# Patient Record
Sex: FEMALE | Race: WHITE | NOT HISPANIC OR LATINO | Employment: FULL TIME | URBAN - METROPOLITAN AREA
[De-identification: names, ages, dates, MRNs, and addresses within clinical notes are randomized per-mention and may not be internally consistent; named-entity substitution may affect disease eponyms.]

---

## 2017-01-21 ENCOUNTER — OFFICE VISIT (OUTPATIENT)
Dept: URGENT CARE | Facility: PHYSICIAN GROUP | Age: 22
End: 2017-01-21
Payer: COMMERCIAL

## 2017-01-21 VITALS
HEART RATE: 101 BPM | TEMPERATURE: 98.5 F | HEIGHT: 68 IN | BODY MASS INDEX: 20.46 KG/M2 | SYSTOLIC BLOOD PRESSURE: 102 MMHG | OXYGEN SATURATION: 98 % | RESPIRATION RATE: 18 BRPM | WEIGHT: 135 LBS | DIASTOLIC BLOOD PRESSURE: 68 MMHG

## 2017-01-21 DIAGNOSIS — S29.011A CHEST WALL MUSCLE STRAIN, INITIAL ENCOUNTER: ICD-10-CM

## 2017-01-21 DIAGNOSIS — J06.9 UPPER RESPIRATORY INFECTION WITH COUGH AND CONGESTION: ICD-10-CM

## 2017-01-21 PROCEDURE — 99214 OFFICE O/P EST MOD 30 MIN: CPT | Performed by: NURSE PRACTITIONER

## 2017-01-21 RX ORDER — CODEINE PHOSPHATE AND GUAIFENESIN 10; 100 MG/5ML; MG/5ML
5 SOLUTION ORAL
Qty: 120 ML | Refills: 0 | Status: SHIPPED | OUTPATIENT
Start: 2017-01-21

## 2017-01-21 RX ORDER — BENZONATATE 100 MG/1
100 CAPSULE ORAL 3 TIMES DAILY PRN
COMMUNITY
End: 2017-12-06

## 2017-01-21 RX ORDER — BENZONATATE 100 MG/1
100 CAPSULE ORAL 3 TIMES DAILY PRN
Qty: 60 CAP | Refills: 0 | Status: SHIPPED | OUTPATIENT
Start: 2017-01-21 | End: 2017-12-06

## 2017-01-21 ASSESSMENT — PATIENT HEALTH QUESTIONNAIRE - PHQ9: CLINICAL INTERPRETATION OF PHQ2 SCORE: 0

## 2017-01-21 NOTE — MR AVS SNAPSHOT
"        Lucio Barba   2017 11:15 AM   Office Visit   MRN: [id number]    Department:  [facility]   Dept Phone:  [phone]    Description:  Female : 1995   Provider:  ANA Summers           Reason for Visit     Cough x4 days (restarted), causing pain L rib area      Allergies as of 2017     No Known Allergies      You were diagnosed with     Upper respiratory infection with cough and congestion   [[id number]]         Vital Signs     Blood Pressure Pulse Temperature Respirations Height Weight    102/68 mmHg 101 36.9 °C (98.5 °F) 18 1.727 m (5' 7.99\") 61.236 kg (135 lb)    Body Mass Index Oxygen Saturation Last Menstrual Period Breastfeeding? Smoking Status       20.53 kg/m2 98% 2017 No Never Smoker        Basic Information     Date Of Birth Sex Race Ethnicity Preferred Language    1995 Female White Non- English      Health Maintenance        Date Due Completion Dates    IMM HEP B VACCINE (1 of 3 - Primary Series) 1994 ---    IMM HEP A VACCINE (1 of 2 - Standard Series) 1995 ---    IMM HPV VACCINE (1 of 3 - Female 3 Dose Series) 2005 ---    IMM VARICELLA (CHICKENPOX) VACCINE (1 of 2 - 2 Dose Adolescent Series) 2007 ---    IMM DTaP/Tdap/Td Vaccine (1 - Tdap) 2013 ---    PAP SMEAR 2015 ---    IMM INFLUENZA (1) 2016 ---            Current Immunizations     No immunizations on file.      Below and/or attached are the medications your provider expects you to take. Review all of your home medications and newly ordered medications with your provider and/or pharmacist. Follow medication instructions as directed by your provider and/or pharmacist. Please keep your medication list with you and share with your provider. Update the information when medications are discontinued, doses are changed, or new medications (including over-the-counter products) are added; and carry medication information at all times in the event of emergency " situations     Allergies:  No Known Allergies          Medications  Valid as of: January 21, 2017 - 11:56 AM    Generic Name Brand Name Tablet Size Instructions for use    Benzonatate (Cap) TESSALON 100 MG Take 100 mg by mouth 3 times a day as needed for Cough.        Benzonatate (Cap) TESSALON 100 MG Take 1 Cap by mouth 3 times a day as needed for Cough.        Guaifenesin-Codeine (Solution) ROBITUSSIN -10 mg/5mL Take 5 mL by mouth at bedtime as needed.        Levonorgestrel-Ethinyl Estrad   Take  by mouth.        .                 Medicines prescribed today were sent to:     [facility][id number] - [place], [place] - [address]    [address] [place] [address]    Phone: [phone] Fax: [phone]    Open 24 Hours?: No    [id number] - [place], [place] - [address] [place] [address]    [address] [place] [address] [place] [address]    Phone: [phone] Fax: [phone]    Open 24 Hours?: No    [id number] - [place], [place] - [address]    [address] [place] [address]    Phone: [phone] Fax: [phone]    Open 24 Hours?: No      Medication refill instructions:       If your prescription bottle indicates you have medication refills left, it is not necessary to call your provider’s office. Please contact your pharmacy and they will refill your medication.    If your prescription bottle indicates you do not have any refills left, you may request refills at any time through one of the following ways: The online [facility] system (except Urgent Care), by calling your provider’s office, or by asking your pharmacy to contact your provider’s office with a refill request. Medication refills are processed only during regular business hours and may not be available until the next business day. Your provider may request additional information or to have a follow-up visit with you prior to refilling your medication.   *Please Note: Medication refills are assigned a new Rx number when refilled electronically.  Your pharmacy may indicate that no refills were authorized even though a new prescription for the same medication is available at the pharmacy. Please request the medicine by name with the pharmacy before contacting your provider for a refill.           MyChart Status: Patient Declined

## 2017-01-21 NOTE — PROGRESS NOTES
Chief Complaint   Patient presents with   • Cough     x4 days (restarted), causing pain L rib area       HISTORY OF PRESENT ILLNESS: Patient is a 21 y.o. female who presents today due to symptoms that started four days ago. Pt reports a dry cough without blood in sputum, chills, and nasal congestion in the morning. Denies associated chest pain, shortness of breath, wheezing, sore throat, sinus pressure, fever, or body aches. She also reports left sided chest wall pain with coughing, denies injury or trauma. Denies h/o asthma/copd/CAP. No immunocompromise. Has tried OTC cold medications without significant relief of symptoms. No recent ABX use. No other aggravating or alleviating factors.     There are no active problems to display for this patient.      Allergies:Review of patient's allergies indicates no known allergies.    Current Outpatient Prescriptions Ordered in [facility]   Medication Sig Dispense Refill   • benzonatate (TESSALON) 100 MG Cap Take 100 mg by mouth 3 times a day as needed for Cough.     • Levonorgestrel-Ethinyl Estrad (AVIANE PO) Take  by mouth.       No current Epic-ordered facility-administered medications on file.       No past medical history on file.    Social History   Substance Use Topics   • Smoking status: Never Smoker    • Smokeless tobacco: Not on file   • Alcohol Use: No       No family status information on file.   No family history on file.    ROS:  Review of Systems   Constitutional: Positive for chills. Negative for fever, weight loss and malaise.  HENT: Positive for congestion. Negative for sore throat, ear pain, nosebleeds, and neck pain.    Eyes: Negative for vision changes.   Cardiovascular: Negative for chest pain, palpitations, orthopnea and leg swelling.   Respiratory: Positive for cough without sputum production, left sided chest wall pain with coughing. Negative for shortness of breath and wheezing.   Gastrointestinal: Negative for abdominal pain, nausea, vomiting or diarrhea.  "  Skin: Negative for rash, diaphoresis.     Exam:  Blood pressure 102/68, pulse 101, temperature 36.9 °C (98.5 °F), resp. rate 18, height 1.727 m (5' 7.99\"), weight 61.236 kg (135 lb), last menstrual period 01/13/2017, SpO2 98 %, not currently breastfeeding.  General: well-nourished, well-developed female in NAD  Head: normocephalic, atraumatic  Eyes: PERRLA, EOM within normal limits, no conjunctival injection, no scleral icterus, visual fields and acuity grossly intact.  Ears: normal shape and symmetry, no tenderness, no discharge. External canals are without any significant edema or erythema. Tympanic membranes are without any inflammation, no effusion. Gross auditory acuity is intact.  Nose: symmetrical without tenderness, mild discharge, erythema present bilateral nares.  Mouth/Throat: reasonable hygiene, no exudates or tonsillar enlargement. Erythema present.   Neck: no masses, range of motion within normal limits, no tracheal deviation.  Lymph: mild cervical adenopathy. No supraclavicular adenopathy.   Neuro: alert and oriented. Cranial nerves 1-12 grossly intact.   Cardiovascular: regular rate and rhythm without murmurs, rubs, or gallops. No edema.   Pulmonary: no distress. Chest is symmetrical with respiration, no wheezes, crackles, or rhonchi. There is mild left sided mid axillary chest wall tenderness with palpation, no crepitus or ecchymosis, no bony tenderness.   Musculoskeletal: appropriate muscle tone, gait is stable.  Skin: warm, dry, intact, no clubbing, no cyanosis.   Psych: appropriate mood, affect, judgement.         Assessment/Plan:  1. Upper respiratory infection with cough and congestion  guaifenesin-codeine (ROBITUSSIN AC) Solution oral solution    benzonatate (TESSALON) 100 MG Cap   2. Chest wall muscle strain, initial encounter             Discussed that I felt this was viral in nature. Did not see any evidence of a bacterial process. Discussed natural progression and sx care.  Rest, " increase fluids, hand and respiratory hygiene. May take OTC medications as directed for symptom relief. Honey for cough.   Tessalon for daytime cough. Robitussin AC for night time cough, sedative effects of medication discussed with patient.   Warm compresses and OTC NSAIDS for chest muscle strain.   Supportive care, differential diagnoses, and indications for immediate follow-up discussed with patient.   Pathogenesis of diagnosis discussed including typical length and natural progression.  Instructed to return to clinic or nearest emergency department for any change in condition, further concerns, or worsening of symptoms.  Patient states understanding of the plan of care and discharge instructions.  Instructed to make an appointment with their primary care provider in the next 3-7 days if not significantly improving and for further care.         Please note that this dictation was created using voice recognition software. I have made every reasonable attempt to correct obvious errors, but I expect that there are errors of grammar and possibly content that I did not discover before finalizing the note.      SAQIB Scanlon.

## 2017-12-06 ENCOUNTER — OFFICE VISIT (OUTPATIENT)
Dept: URGENT CARE | Facility: PHYSICIAN GROUP | Age: 22
End: 2017-12-06
Payer: COMMERCIAL

## 2017-12-06 VITALS
RESPIRATION RATE: 14 BRPM | TEMPERATURE: 98.5 F | WEIGHT: 141 LBS | SYSTOLIC BLOOD PRESSURE: 106 MMHG | OXYGEN SATURATION: 99 % | DIASTOLIC BLOOD PRESSURE: 64 MMHG | HEIGHT: 67 IN | BODY MASS INDEX: 22.13 KG/M2 | HEART RATE: 68 BPM

## 2017-12-06 DIAGNOSIS — J06.9 VIRAL URI: ICD-10-CM

## 2017-12-06 PROCEDURE — 99214 OFFICE O/P EST MOD 30 MIN: CPT | Performed by: FAMILY MEDICINE

## 2017-12-06 RX ORDER — BENZONATATE 100 MG/1
100 CAPSULE ORAL 3 TIMES DAILY PRN
Qty: 60 CAP | Refills: 0 | Status: SHIPPED | OUTPATIENT
Start: 2017-12-06

## 2017-12-06 ASSESSMENT — ENCOUNTER SYMPTOMS
RHINORRHEA: 1
ABDOMINAL PAIN: 0
HEADACHES: 0
COUGH: 1

## 2017-12-06 NOTE — PROGRESS NOTES
"Subjective:   Lucio Barba is a 22 y.o. female who presents for URI (x5days chest congestion, runny nose , scratchy throat, cough)        URI    This is a new problem. The current episode started in the past 7 days. The problem has been rapidly worsening. Maximum temperature: unmeasured. Associated symptoms include congestion, coughing and rhinorrhea. Pertinent negatives include no abdominal pain or headaches.     Review of Systems   HENT: Positive for congestion and rhinorrhea.    Respiratory: Positive for cough.    Gastrointestinal: Negative for abdominal pain.   Neurological: Negative for headaches.     No Known Allergies   Objective:   /64   Pulse 68   Temp 36.9 °C (98.5 °F)   Resp 14   Ht 1.702 m (5' 7\")   Wt 64 kg (141 lb)   SpO2 99%   BMI 22.08 kg/m²   Physical Exam   Constitutional: She is oriented to person, place, and time. She appears well-developed and well-nourished. No distress.   HENT:   Head: Normocephalic and atraumatic.   Nose: Mucosal edema and rhinorrhea present. No sinus tenderness. Right sinus exhibits no maxillary sinus tenderness and no frontal sinus tenderness. Left sinus exhibits no maxillary sinus tenderness and no frontal sinus tenderness.   Eyes: Conjunctivae and EOM are normal. Pupils are equal, round, and reactive to light.   Cardiovascular: Normal rate and regular rhythm.    No murmur heard.  Pulmonary/Chest: Effort normal and breath sounds normal. No respiratory distress.   Abdominal: Soft. She exhibits no distension. There is no tenderness.   Musculoskeletal: Normal range of motion.   Neurological: She is alert and oriented to person, place, and time. She has normal reflexes. No sensory deficit.   Skin: Skin is warm and dry.   Psychiatric: She has a normal mood and affect.         Assessment/Plan:   Assessment    1. Viral URI  Differential diagnosis, natural history, supportive care, and indications for immediate follow-up discussed.   - benzonatate (TESSALON) 100 MG " Cap; Take 1 Cap by mouth 3 times a day as needed for Cough.  Dispense: 60 Cap; Refill: 0

## 2017-12-06 NOTE — LETTER
December 6, 2017         Patient: Lucio Barba   YOB: 1995   Date of Visit: 12/6/2017           To Whom it May Concern:    Lucio Barba was seen in my clinic on 12/6/2017. She may return to work on 12/9/2017 unless improved prior..    If you have any questions or concerns, please don't hesitate to call.        Sincerely,           Faisal Ball M.D.  Electronically Signed

## 2019-03-13 ENCOUNTER — OFFICE VISIT (OUTPATIENT)
Dept: URGENT CARE | Facility: PHYSICIAN GROUP | Age: 24
End: 2019-03-13
Payer: COMMERCIAL

## 2019-03-13 VITALS
TEMPERATURE: 97.4 F | HEART RATE: 76 BPM | RESPIRATION RATE: 18 BRPM | WEIGHT: 141 LBS | OXYGEN SATURATION: 97 % | BODY MASS INDEX: 22.13 KG/M2 | HEIGHT: 67 IN | DIASTOLIC BLOOD PRESSURE: 78 MMHG | SYSTOLIC BLOOD PRESSURE: 100 MMHG

## 2019-03-13 DIAGNOSIS — R05.9 COUGH: ICD-10-CM

## 2019-03-13 DIAGNOSIS — M54.50 ACUTE BILATERAL LOW BACK PAIN WITHOUT SCIATICA: ICD-10-CM

## 2019-03-13 DIAGNOSIS — J02.9 SORE THROAT: ICD-10-CM

## 2019-03-13 DIAGNOSIS — S39.012A LOW BACK STRAIN, INITIAL ENCOUNTER: ICD-10-CM

## 2019-03-13 LAB
APPEARANCE UR: NORMAL
BILIRUB UR STRIP-MCNC: NORMAL MG/DL
COLOR UR AUTO: NORMAL
GLUCOSE UR STRIP.AUTO-MCNC: NEGATIVE MG/DL
INT CON NEG: NORMAL
INT CON NEG: NORMAL
INT CON POS: NORMAL
INT CON POS: NORMAL
KETONES UR STRIP.AUTO-MCNC: 40 MG/DL
LEUKOCYTE ESTERASE UR QL STRIP.AUTO: NEGATIVE
NITRITE UR QL STRIP.AUTO: NEGATIVE
PH UR STRIP.AUTO: 5.5 [PH] (ref 5–8)
POC URINE PREGNANCY TEST: NEGATIVE
PROT UR QL STRIP: 30 MG/DL
RBC UR QL AUTO: NORMAL
S PYO AG THROAT QL: NEGATIVE
SP GR UR STRIP.AUTO: 1.03
UROBILINOGEN UR STRIP-MCNC: 1 MG/DL

## 2019-03-13 PROCEDURE — 87880 STREP A ASSAY W/OPTIC: CPT | Performed by: NURSE PRACTITIONER

## 2019-03-13 PROCEDURE — 81002 URINALYSIS NONAUTO W/O SCOPE: CPT | Performed by: NURSE PRACTITIONER

## 2019-03-13 PROCEDURE — 99214 OFFICE O/P EST MOD 30 MIN: CPT | Performed by: NURSE PRACTITIONER

## 2019-03-13 PROCEDURE — 81025 URINE PREGNANCY TEST: CPT | Performed by: NURSE PRACTITIONER

## 2019-03-13 RX ORDER — BENZONATATE 100 MG/1
100 CAPSULE ORAL 3 TIMES DAILY PRN
Qty: 60 CAP | Refills: 0 | Status: SHIPPED | OUTPATIENT
Start: 2019-03-13

## 2019-03-13 ASSESSMENT — ENCOUNTER SYMPTOMS
HEADACHES: 0
SHORTNESS OF BREATH: 0
WEAKNESS: 0
EYE DISCHARGE: 0
FEVER: 0
CHILLS: 0
SORE THROAT: 1
ORTHOPNEA: 0
FLANK PAIN: 0
FOCAL WEAKNESS: 0
CONSTIPATION: 0
DIZZINESS: 0
WHEEZING: 0
BACK PAIN: 1
MYALGIAS: 1
SPUTUM PRODUCTION: 0
EYE REDNESS: 0
SENSORY CHANGE: 0
NECK PAIN: 0
ABDOMINAL PAIN: 0
DIARRHEA: 0
COUGH: 1
FALLS: 0
VOMITING: 0
PALPITATIONS: 0
SINUS PAIN: 0
TINGLING: 0
NAUSEA: 0

## 2019-03-13 NOTE — PROGRESS NOTES
Subjective:      Lucio Barba is a 23 y.o. female who presents with Pharyngitis (x 1 day, lower back pain x 2 days)            HPI  Sore throat, nasal congestion, PND. Denies fever. Mild cough. Requesting tessalon perles, cough worse at work with exertion. Denies asthma, SOB, chest tightness or wheeze.    C/o bilat low back pain. Denies dysuria, urinary frequency or urgency. Denies n/v/d or abdominal pain. H/o scoliosis. 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 Does not recall any injury at work.       PMH:  has no past medical history on file.  MEDS:   Current Outpatient Prescriptions:   •  benzonatate (TESSALON) 100 MG Cap, Take 1 Cap by mouth 3 times a day as needed for Cough., Disp: 60 Cap, Rfl: 0  •  guaifenesin-codeine (ROBITUSSIN AC) Solution oral solution, Take 5 mL by mouth at bedtime as needed., Disp: 120 mL, Rfl: 0  •  Levonorgestrel-Ethinyl Estrad (AVIANE PO), Take  by mouth., Disp: , Rfl:   ALLERGIES:   Allergies   Allergen Reactions   • Codeine      Family history of codeine allergy, patient does not want codeine     SURGHX: History reviewed. No pertinent surgical history.  SOCHX:  reports that she has never smoked. She has never used smokeless tobacco. She reports that she does not drink alcohol or use drugs.  FH: Family history was reviewed, no pertinent findings to report    Review of Systems   Constitutional: Negative for chills, fever and malaise/fatigue.   HENT: Positive for congestion and sore throat. Negative for ear pain and sinus pain.    Eyes: Negative for discharge and redness.   Respiratory: Positive for cough. Negative for sputum production, shortness of breath and wheezing.    Cardiovascular: Negative for chest pain, palpitations and orthopnea.   Gastrointestinal: Negative for abdominal pain, constipation, diarrhea, nausea and vomiting.   Genitourinary: Negative for dysuria, flank pain, frequency, hematuria and urgency.   Musculoskeletal: Positive for back pain and  "myalgias. Negative for falls, joint pain and neck pain.   Skin: Negative for itching and rash.   Neurological: Negative for dizziness, tingling, sensory change, focal weakness, weakness and headaches.   Endo/Heme/Allergies: Negative for environmental allergies.   All other systems reviewed and are negative.         Objective:     /78   Pulse 76   Temp 36.3 °C (97.4 °F) (Temporal)   Resp 18   Ht 1.702 m (5' 7\")   Wt 64 kg (141 lb)   LMP 02/20/2019   SpO2 97%   BMI 22.08 kg/m²      Physical Exam   Constitutional: She is oriented to person, place, and time. Vital signs are normal. She appears well-developed and well-nourished. She is active and cooperative.  Non-toxic appearance. She does not have a sickly appearance. She does not appear ill. No distress.   HENT:   Head: Normocephalic.   Right Ear: External ear and ear canal normal. Tympanic membrane is injected.   Left Ear: External ear and ear canal normal. Tympanic membrane is injected.   Nose: Mucosal edema and rhinorrhea present.   Mouth/Throat: Uvula is midline. Mucous membranes are dry. No uvula swelling. Posterior oropharyngeal erythema present.   Eyes: Pupils are equal, round, and reactive to light. Conjunctivae and EOM are normal.   Neck: Normal range of motion. Neck supple.   Cardiovascular: Normal rate and regular rhythm.    Pulmonary/Chest: Effort normal and breath sounds normal. No accessory muscle usage. No respiratory distress. She has no decreased breath sounds. She has no wheezes. She has no rhonchi. She has no rales.   Musculoskeletal: Normal range of motion. She exhibits no edema or deformity.        Lumbar back: She exhibits tenderness. She exhibits normal range of motion, no bony tenderness, no swelling, no edema, no deformity, no pain and no spasm.        Back:    Mild tenderness along lumbar spine and paraspinous muscles. FROM of all movements without difficulty or pain. No antalgic gait.   Lymphadenopathy:     She has no cervical " adenopathy.   Neurological: She is alert and oriented to person, place, and time.   Skin: Skin is warm and dry. No rash noted. She is not diaphoretic. No erythema.   Vitals reviewed.              Assessment/Plan:     1. Sore throat    - POCT Rapid Strep A: NEG    2. Cough    - benzonatate (TESSALON) 100 MG Cap; Take 1 Cap by mouth 3 times a day as needed for Cough.  Dispense: 60 Cap; Refill: 0    3. Acute bilateral low back pain without sciatica    - POCT Urinalysis  - POCT Pregnancy    4. Low back strain, initial encounter    Take NSAID prn for discomfort  May use cool compresses for swelling and warm compresses for muscle stiffness   May perform muscle stretches as tolerated after warm compresses to maintain mobility, avoid abdominal crunches  May apply topical analgesics prn  Perform proper body mechanics with lifting, twisting, bending and walking. Ask for assistance with heavy objects prn  Monitor for bowel/urination problems, numbness/tingling in extremities, decreased ROM with ambulation difficulty- need re-evaluation    Increase water intake  May use Ibuprofen/Tylenol prn for fever or throat pain  Get rest  May use daily longer acting antihistamine prn  May use saline nasal spray prn to flush any nasal congestion   Monitor for fevers, productive cough, SOB, CP, chest tightness- need re-evaluation

## 2019-11-06 ENCOUNTER — OFFICE VISIT (OUTPATIENT)
Dept: URGENT CARE | Facility: PHYSICIAN GROUP | Age: 24
End: 2019-11-06
Payer: COMMERCIAL

## 2019-11-06 VITALS
OXYGEN SATURATION: 99 % | HEART RATE: 110 BPM | RESPIRATION RATE: 16 BRPM | TEMPERATURE: 97.3 F | WEIGHT: 130 LBS | HEIGHT: 68 IN | BODY MASS INDEX: 19.7 KG/M2 | DIASTOLIC BLOOD PRESSURE: 74 MMHG | SYSTOLIC BLOOD PRESSURE: 118 MMHG

## 2019-11-06 DIAGNOSIS — G56.92 NEUROPATHY, ARM, LEFT: ICD-10-CM

## 2019-11-06 PROCEDURE — 99203 OFFICE O/P NEW LOW 30 MIN: CPT | Performed by: EMERGENCY MEDICINE

## 2019-11-06 SDOH — HEALTH STABILITY: MENTAL HEALTH: HOW OFTEN DO YOU HAVE A DRINK CONTAINING ALCOHOL?: MONTHLY OR LESS

## 2019-11-06 ASSESSMENT — ENCOUNTER SYMPTOMS
FOCAL WEAKNESS: 0
TINGLING: 1
FEVER: 0
MUSCLE WEAKNESS: 1
NUMBNESS: 1
NECK PAIN: 0

## 2019-11-06 NOTE — PROGRESS NOTES
Subjective:      Lucio Barba is a 23 y.o. female who presents with Arm Pain (L shoulder/arm [id number] )            Arm Pain    Incident onset: this am. There was no injury mechanism. The pain is present in the left forearm and left elbow. The pain radiates to the left neck. Associated symptoms include muscle weakness, numbness and tingling. Pertinent negatives include no chest pain. The symptoms are aggravated by movement. She has tried nothing for the symptoms. The treatment provided moderate relief.   No specific trauma; patient awakened with left proximal forearm, biceps region pain aggravated by movement.  Pain improving, notes intermittent paresthesias of fingers now.    Review of Systems   Constitutional: Negative for fever.   Cardiovascular: Negative for chest pain.   Musculoskeletal: Negative for neck pain.   Skin: Negative for rash.   Neurological: Positive for tingling and numbness. Negative for focal weakness.       PMH:  has no past medical history on file.  MEDS:   Current Outpatient Medications:   •  benzonatate (TESSALON) 100 MG Cap, Take 1 Cap by mouth 3 times a day as needed for Cough. (Patient not taking: Reported on 11/6/2019), Disp: 60 Cap, Rfl: 0  •  benzonatate (TESSALON) 100 MG Cap, Take 1 Cap by mouth 3 times a day as needed for Cough. (Patient not taking: Reported on 11/6/2019), Disp: 60 Cap, Rfl: 0  •  guaifenesin-codeine (ROBITUSSIN AC) Solution oral solution, Take 5 mL by mouth at bedtime as needed. (Patient not taking: Reported on 11/6/2019), Disp: 120 mL, Rfl: 0  •  Levonorgestrel-Ethinyl Estrad (AVIANE PO), Take  by mouth., Disp: , Rfl:   ALLERGIES:   Allergies   Allergen Reactions   • Codeine      Family history of codeine allergy, patient does not want codeine     SURGHX: History reviewed. No pertinent surgical history.  SOCHX:  reports that she has never smoked. She has never used smokeless tobacco. She reports current alcohol use. She reports that she does not use drugs.  FH:  "family history is not on file.   Objective:     /74   Pulse (!) 110   Temp 36.3 °C (97.3 °F) (Temporal)   Resp 16   Ht 1.727 m (5' 8\")   Wt 59 kg (130 lb)   SpO2 99%   BMI 19.77 kg/m²      Physical Exam  Constitutional:       Appearance: Normal appearance. She is well-developed and well-groomed.   Neck:      Musculoskeletal: Normal range of motion. No spinous process tenderness or muscular tenderness.      Comments: Negative Spurling's test.  Cardiovascular:      Pulses:           Radial pulses are 2+ on the left side.   Pulmonary:      Effort: Pulmonary effort is normal.   Musculoskeletal:      Left shoulder: Normal.      Left elbow: She exhibits decreased range of motion. She exhibits no swelling, no effusion and no deformity. Tenderness found. Radial head and lateral epicondyle tenderness noted. No medial epicondyle and no olecranon process tenderness noted.   Skin:     General: Skin is warm and dry.   Neurological:      Mental Status: She is alert.      Comments: Distal motor function intact. Distal sensation to light touch and pressure intact.   Psychiatric:         Behavior: Behavior is cooperative.                 Assessment/Plan:       1. Neuropathy, arm, left  Appears sleep-associated compressive etiology.  Advised rest from aggravating activities, OTC NSAID PRN  Advised recheck if not resolving in 2 to 3 days.    " Price (Use Numbers Only, No Special Characters Or $): 105 Anesthesia Volume In Cc: 0 Consent: The patient's consent was obtained including but not limited to risks of crusting, scabbing, blistering, scarring, darker or lighter pigmentary change, recurrence, incomplete removal and infection. Detail Level: Zone Post-Care Instructions: I reviewed with the patient in detail post-care instructions. Patient is to wear sunprotection, and avoid picking at any of the treated lesions. Pt may apply Vaseline to crusted or scabbing areas.

## 2019-11-06 NOTE — LETTER
November 6, 2019       Patient: Lucio Barba   YOB: 1995   Date of Visit: 11/6/2019         To Whom It May Concern:    It is my medical opinion that Lucio Barba has work restriction November 6 and 7, 2019: No left arm overhead activity, no left arm push/pull greater than 10 pounds, no lifting greater than 25 pounds.    Sincerely,          Denis Jamil M.D.  Electronically Signed

## 2020-12-17 ENCOUNTER — NON-PROVIDER VISIT (OUTPATIENT)
Dept: URGENT CARE | Facility: PHYSICIAN GROUP | Age: 25
End: 2020-12-17

## 2020-12-17 DIAGNOSIS — Z02.1 PRE-EMPLOYMENT DRUG SCREENING: ICD-10-CM

## 2020-12-17 LAB
AMP AMPHETAMINE: NORMAL
COC COCAINE: NORMAL
INT CON NEG: NORMAL
INT CON POS: NORMAL
MET METHAMPHETAMINES: NORMAL
OPI OPIATES: NORMAL
PCP PHENCYCLIDINE: NORMAL
POC DRUG COMMENT 753798-OCCUPATIONAL HEALTH: NEGATIVE
THC: NORMAL

## 2020-12-17 PROCEDURE — 80305 DRUG TEST PRSMV DIR OPT OBS: CPT | Performed by: PHYSICIAN ASSISTANT
